# Patient Record
Sex: MALE | Race: WHITE | NOT HISPANIC OR LATINO
[De-identification: names, ages, dates, MRNs, and addresses within clinical notes are randomized per-mention and may not be internally consistent; named-entity substitution may affect disease eponyms.]

---

## 2024-08-15 ENCOUNTER — DASHBOARD ENCOUNTERS (OUTPATIENT)
Age: 59
End: 2024-08-15

## 2024-08-15 ENCOUNTER — OFFICE VISIT (OUTPATIENT)
Dept: URBAN - METROPOLITAN AREA CLINIC 52 | Facility: CLINIC | Age: 59
End: 2024-08-15
Payer: COMMERCIAL

## 2024-08-15 VITALS
WEIGHT: 207.6 LBS | OXYGEN SATURATION: 95 % | HEART RATE: 87 BPM | TEMPERATURE: 98.1 F | SYSTOLIC BLOOD PRESSURE: 123 MMHG | DIASTOLIC BLOOD PRESSURE: 74 MMHG | HEIGHT: 74 IN | BODY MASS INDEX: 26.64 KG/M2

## 2024-08-15 DIAGNOSIS — K52.89 OTHER SPECIFIED NONINFECTIVE GASTROENTERITIS AND COLITIS: ICD-10-CM

## 2024-08-15 PROCEDURE — 99203 OFFICE O/P NEW LOW 30 MIN: CPT | Performed by: INTERNAL MEDICINE

## 2024-08-15 RX ORDER — LAMOTRIGINE 200 MG/1
TAKE ONE TABLET BY MOUTH ONE TIME DAILY IN THE MORNING TABLET ORAL
Qty: 90 UNSPECIFIED | Refills: 1 | Status: ACTIVE | COMMUNITY

## 2024-08-15 RX ORDER — DEXTROAMPHETAMINE SACCHARATE, AMPHETAMINE ASPARTATE MONOHYDRATE, DEXTROAMPHETAMINE SULFATE, AND AMPHETAMINE SULFATE 7.5; 7.5; 7.5; 7.5 MG/1; MG/1; MG/1; MG/1
CAPSULE, EXTENDED RELEASE ORAL
Qty: 30 CAPSULE | Status: ACTIVE | COMMUNITY

## 2024-08-15 RX ORDER — CLOTRIMAZOLE 10 MG/1
LOZENGE ORAL; TOPICAL
Qty: 60 TROCHE | Status: ON HOLD | COMMUNITY

## 2024-08-15 RX ORDER — METRONIDAZOLE 500 MG/1
TAKE ONE TABLET BY MOUTH TWICE A DAY ( IN THE MORNING AND BEFORE BEDTIME ) FOR 14 DAYS TABLET, FILM COATED ORAL
Qty: 28 UNSPECIFIED | Refills: 0 | Status: ON HOLD | COMMUNITY

## 2024-08-15 NOTE — HPI-TODAY'S VISIT:
59 y.o. male presents to office for chronic diarrhea.  Stool is described as unformed and extremely loose. He has at least 1 episode per day. Symptoms started after possible food poisoning in April 2024. Patient has seen no improvement in symptoms with a 10 day course of Flagyl, daily Metamucil, or probiotics.   Previously followed by [facility], with last office visit 7/16/24,note reviewed. Work up below.  Colonoscopy 6/6/24 (Dr. Ish White): 5mm SC (tubular adenoma), 10mm TC (sessile serrated adenoma), Grade 2 IH and EH. Fair prep, not adequate for polyps <5mm in size. Pathology from random bx negative for active, chronic, microscopic colitis. Recommendation to repeat in 1 year.   Stool studies 5/24/24: GI PCR negative. Pancreatic elastase normal (514.51). H. pylori stool antigen negative. Fecal fat >60 (elevated).   Labs 7/16/24: CBC normal. Food allergy panel negative.

## 2024-09-26 ENCOUNTER — OFFICE VISIT (OUTPATIENT)
Dept: URBAN - METROPOLITAN AREA CLINIC 52 | Facility: CLINIC | Age: 59
End: 2024-09-26

## 2024-10-24 ENCOUNTER — TELEPHONE ENCOUNTER (OUTPATIENT)
Dept: URBAN - METROPOLITAN AREA CLINIC 52 | Facility: CLINIC | Age: 59
End: 2024-10-24

## 2024-10-25 ENCOUNTER — TELEPHONE ENCOUNTER (OUTPATIENT)
Dept: URBAN - METROPOLITAN AREA CLINIC 52 | Facility: CLINIC | Age: 59
End: 2024-10-25

## 2024-12-10 ENCOUNTER — OFFICE VISIT (OUTPATIENT)
Dept: URBAN - METROPOLITAN AREA CLINIC 94 | Facility: CLINIC | Age: 59
End: 2024-12-10